# Patient Record
Sex: MALE | Race: WHITE | URBAN - METROPOLITAN AREA
[De-identification: names, ages, dates, MRNs, and addresses within clinical notes are randomized per-mention and may not be internally consistent; named-entity substitution may affect disease eponyms.]

---

## 2018-10-12 ENCOUNTER — OFFICE VISIT (OUTPATIENT)
Dept: FAMILY MEDICINE | Facility: CLINIC | Age: 38
End: 2018-10-12
Payer: COMMERCIAL

## 2018-10-12 VITALS
OXYGEN SATURATION: 98 % | BODY MASS INDEX: 28.22 KG/M2 | DIASTOLIC BLOOD PRESSURE: 68 MMHG | WEIGHT: 201.6 LBS | SYSTOLIC BLOOD PRESSURE: 132 MMHG | TEMPERATURE: 98.9 F | HEART RATE: 72 BPM | HEIGHT: 71 IN

## 2018-10-12 DIAGNOSIS — R20.2 PARESTHESIA: Primary | ICD-10-CM

## 2018-10-12 DIAGNOSIS — Z13.6 CARDIOVASCULAR SCREENING; LDL GOAL LESS THAN 160: ICD-10-CM

## 2018-10-12 LAB
ALBUMIN SERPL-MCNC: 4.1 G/DL (ref 3.4–5)
ALP SERPL-CCNC: 70 U/L (ref 40–150)
ALT SERPL W P-5'-P-CCNC: 23 U/L (ref 0–70)
ANION GAP SERPL CALCULATED.3IONS-SCNC: 4 MMOL/L (ref 3–14)
AST SERPL W P-5'-P-CCNC: 16 U/L (ref 0–45)
BILIRUB SERPL-MCNC: 0.6 MG/DL (ref 0.2–1.3)
BUN SERPL-MCNC: 13 MG/DL (ref 7–30)
CALCIUM SERPL-MCNC: 9 MG/DL (ref 8.5–10.1)
CHLORIDE SERPL-SCNC: 105 MMOL/L (ref 94–109)
CHOLEST SERPL-MCNC: 185 MG/DL
CO2 SERPL-SCNC: 28 MMOL/L (ref 20–32)
CREAT SERPL-MCNC: 1.18 MG/DL (ref 0.66–1.25)
CRP SERPL-MCNC: <2.9 MG/L (ref 0–8)
ERYTHROCYTE [DISTWIDTH] IN BLOOD BY AUTOMATED COUNT: 12.9 % (ref 10–15)
ERYTHROCYTE [SEDIMENTATION RATE] IN BLOOD BY WESTERGREN METHOD: 5 MM/H (ref 0–15)
FERRITIN SERPL-MCNC: 346 NG/ML (ref 26–388)
FOLATE SERPL-MCNC: 11.1 NG/ML
GFR SERPL CREATININE-BSD FRML MDRD: 69 ML/MIN/1.7M2
GLUCOSE SERPL-MCNC: 103 MG/DL (ref 70–99)
HCT VFR BLD AUTO: 48 % (ref 40–53)
HDLC SERPL-MCNC: 44 MG/DL
HGB BLD-MCNC: 16.6 G/DL (ref 13.3–17.7)
IRON SATN MFR SERPL: 35 % (ref 15–46)
IRON SERPL-MCNC: 113 UG/DL (ref 35–180)
LDLC SERPL CALC-MCNC: 126 MG/DL
MCH RBC QN AUTO: 30.4 PG (ref 26.5–33)
MCHC RBC AUTO-ENTMCNC: 34.6 G/DL (ref 31.5–36.5)
MCV RBC AUTO: 88 FL (ref 78–100)
NONHDLC SERPL-MCNC: 141 MG/DL
PLATELET # BLD AUTO: 222 10E9/L (ref 150–450)
POTASSIUM SERPL-SCNC: 3.8 MMOL/L (ref 3.4–5.3)
PROT SERPL-MCNC: 7.9 G/DL (ref 6.8–8.8)
RBC # BLD AUTO: 5.46 10E12/L (ref 4.4–5.9)
SODIUM SERPL-SCNC: 137 MMOL/L (ref 133–144)
TIBC SERPL-MCNC: 319 UG/DL (ref 240–430)
TRIGL SERPL-MCNC: 74 MG/DL
VIT B12 SERPL-MCNC: 450 PG/ML (ref 193–986)
WBC # BLD AUTO: 5.9 10E9/L (ref 4–11)

## 2018-10-12 PROCEDURE — 82607 VITAMIN B-12: CPT | Performed by: PHYSICIAN ASSISTANT

## 2018-10-12 PROCEDURE — 83550 IRON BINDING TEST: CPT | Performed by: PHYSICIAN ASSISTANT

## 2018-10-12 PROCEDURE — 36415 COLL VENOUS BLD VENIPUNCTURE: CPT | Performed by: PHYSICIAN ASSISTANT

## 2018-10-12 PROCEDURE — 80053 COMPREHEN METABOLIC PANEL: CPT | Performed by: PHYSICIAN ASSISTANT

## 2018-10-12 PROCEDURE — 83540 ASSAY OF IRON: CPT | Performed by: PHYSICIAN ASSISTANT

## 2018-10-12 PROCEDURE — 80061 LIPID PANEL: CPT | Performed by: PHYSICIAN ASSISTANT

## 2018-10-12 PROCEDURE — 82728 ASSAY OF FERRITIN: CPT | Performed by: PHYSICIAN ASSISTANT

## 2018-10-12 PROCEDURE — 86140 C-REACTIVE PROTEIN: CPT | Performed by: PHYSICIAN ASSISTANT

## 2018-10-12 PROCEDURE — 85652 RBC SED RATE AUTOMATED: CPT | Performed by: PHYSICIAN ASSISTANT

## 2018-10-12 PROCEDURE — 99204 OFFICE O/P NEW MOD 45 MIN: CPT | Performed by: PHYSICIAN ASSISTANT

## 2018-10-12 PROCEDURE — 85027 COMPLETE CBC AUTOMATED: CPT | Performed by: PHYSICIAN ASSISTANT

## 2018-10-12 PROCEDURE — 82746 ASSAY OF FOLIC ACID SERUM: CPT | Performed by: PHYSICIAN ASSISTANT

## 2018-10-12 NOTE — MR AVS SNAPSHOT
"              After Visit Summary   10/12/2018    Lorenzo Downs    MRN: [id number]           Patient Information     Date Of Birth          1980        Visit Information        Provider Department      10/12/2018 7:40 AM Cristy Lutz PA-C [facility]        Today's Diagnoses     Paresthesia    -  1    CARDIOVASCULAR SCREENING; LDL GOAL LESS THAN 160           Follow-ups after your visit        Who to contact     Normal or non-critical lab and imaging results will be communicated to you by [employer], letter or phone within 4 business days after the clinic has received the results. If you do not hear from us within 7 days, please contact the clinic through [employer] or phone. If you have a critical or abnormal lab result, we will notify you by phone as soon as possible.  Submit refill requests through [employer]This or call your pharmacy and they will forward the refill request to us. Please allow 3 business days for your refill to be completed.          If you need to speak with a  for additional information , please call: [phone]           Additional Information About Your Visit        [employer] It Information     DashThis lets you send messages to your doctor, view your test results, renew your prescriptions, schedule appointments and more. To sign up, go to [email or web address] . Click on \"Log in\" on the left side of the screen, which will take you to the Welcome page. Then click on \"Sign up Now\" on the right side of the page.     You will be asked to enter the access code listed below, as well as some personal information. Please follow the directions to create your username and password.     Your access code is: [email or web address]  Expires: 1/10/2019  7:42 AM     Your access code will  in 90 days. If you need help or a new code, please call your [facility] or [phone].        Care EveryWhere ID     This is your Care EveryWhere ID. This could be used by other " "organizations to access your [facility] medical records  [id number]        Your Vitals Were     Pulse Temperature Height Pulse Oximetry BMI (Body Mass Index)       72 98.9  F (37.2  C) (Tympanic) 5' 10.5\" (1.791 m) 98% 28.52 kg/m2        Blood Pressure from Last 3 Encounters:   10/12/18 132/68    Weight from Last 3 Encounters:   10/12/18 201 lb 9.6 oz (91.4 kg)              We Performed the Following     CBC with platelets     Comprehensive metabolic panel     Ferritin     Iron and iron binding capacity     Lipid panel reflex to direct LDL Fasting     Vitamin B12        Primary Care Provider Fax #    Physician No Ref-Primary [phone]       No address on file        Equal Access to Services     TINA CHISHOLM : Jim Ryder, tashi dewey, kim kaalmada stewart, anju hollis . So [facility] [phone].    ATENCIÓN: Si habla español, tiene a sherwood disposición servicios gratuitos de asistencia lingüística. Llame al [phone].    We comply with applicable federal civil rights laws and [place] laws. We do not discriminate on the basis of race, color, national origin, age, disability, sex, sexual orientation, or gender identity.            Thank you!     Thank you for choosing [facility]  for your care. Our goal is always to provide you with excellent care. Hearing back from our patients is one way we can continue to improve our services. Please take a few minutes to complete the written survey that you may receive in the mail after your visit with us. Thank you!             Your Updated Medication List - Protect others around you: Learn how to safely use, store and throw away your medicines at www.disposemymeds.org.      Notice  As of 10/12/2018  8:16 AM    You have not been prescribed any medications.      "

## 2018-10-12 NOTE — NURSING NOTE
"Initial /68  Pulse 72  Temp 98.9  F (37.2  C) (Tympanic)  Ht 5' 10.5\" (1.791 m)  Wt 201 lb 9.6 oz (91.4 kg)  SpO2 98%  BMI 28.52 kg/m2 Estimated body mass index is 28.52 kg/(m^2) as calculated from the following:    Height as of this encounter: 5' 10.5\" (1.791 m).    Weight as of this encounter: 201 lb 9.6 oz (91.4 kg). .    Itzel Davis CMA(AMAA)    "

## 2018-10-12 NOTE — PROGRESS NOTES
SUBJECTIVE:   Lorenzo Downs is a 38 year old male who presents to clinic today for the following health issues:      Musculoskeletal problem/pain      Duration: 1 year ongoing, won't go away.    Description  Location: left forearm, both legs to feet.     Intensity:  moderate    Accompanying signs and symptoms: numbness and tingling    History  Previous similar problem: no   Previous evaluation:  none    Precipitating or alleviating factors:  Trauma or overuse: no   Aggravating factors include:prolonged sitting and standing    Therapies tried and outcome: acetaminophen and Ibuprofen rarely     Symptoms come on daily.  He does do a lot of work on the computer.  Numbness is in the left forearm and upper arm mainly, sometimes on the right (does not affect his hands).  Occurs anytime there is compression to the area.  Symptoms will last a couple hours.     Sometimes it will keep him up at night and he finds himself struggling to fall asleep.  Symptoms occur while at rest.  Activity and moving around help.      He has noticed he is a bit more thirsty recently.                Problem list and histories reviewed & adjusted, as indicated.  Additional history: as documented    There is no problem list on file for this patient.    History reviewed. No pertinent surgical history.    Social History   Substance Use Topics     Smoking status: Never Smoker     Smokeless tobacco: Never Used     Alcohol use Yes      Comment: occasio nal     History reviewed. No pertinent family history.      No current outpatient prescriptions on file.     BP Readings from Last 3 Encounters:   10/12/18 132/68    Wt Readings from Last 3 Encounters:   10/12/18 201 lb 9.6 oz (91.4 kg)                    Reviewed and updated as needed this visit by clinical staff       Reviewed and updated as needed this visit by Provider         ROS:  Constitutional, HEENT, cardiovascular, pulmonary, GI, , musculoskeletal, neuro, skin, endocrine and psych systems  "are negative, except as otherwise noted.    OBJECTIVE:     /68  Pulse 72  Temp 98.9  F (37.2  C) (Tympanic)  Ht 5' 10.5\" (1.791 m)  Wt 201 lb 9.6 oz (91.4 kg)  SpO2 98%  BMI 28.52 kg/m2  Body mass index is 28.52 kg/(m^2).  GENERAL: healthy, alert and no distress  EYES: Eyes grossly normal to inspection, PERRL and conjunctivae and sclerae normal  HENT: ear canals and TM's normal, nose and mouth without ulcers or lesions  NECK: no adenopathy, no asymmetry, masses, or scars and thyroid normal to palpation  RESP: lungs clear to auscultation - no rales, rhonchi or wheezes  CV: regular rate and rhythm, normal S1 S2, no S3 or S4, no murmur, click or rub, no peripheral edema and peripheral pulses strong  ABDOMEN: soft, nontender, no hepatosplenomegaly, no masses and bowel sounds normal  MS: no gross musculoskeletal defects noted, no edema  SKIN: no suspicious lesions or rashes    Neurological Examination:  Mental Status:  Alert and oriented to time, place, and person.  Recent and remote memory intact.  Attention span and concentration normal.  Adequate fund of knowledge.  Speech:  Normal  Cranial Nerves:  Cranial Nerve #2: Visual acuity normal to finger counting.  Pupils equal and reacting to light and to accomodation.  No field defect by confrontation.  Fundus reveals normal disc margins.   Cranial #3, 4, 6:  Eye movements normal in all directions of gaze  Cranial #5: Normal pinprick sensation over the trigeminal, ophthalmic and mandibular division bilaterally.  Motor function normal.  Cranial #7: Face symmetrical in appearance and movement.  Cranial #8: Normal hearing to whispered sounds.  Cranial #9 & 10: Normal palate and uvula movement  Cranial #11:  Shoulder shrug symmetrical  Cranial #12:  Tongue midline with normal movements.  Motor:  Tone:  Normal in both upper and lower limbs.  Bulk:  Normal in both upper and lower limbs  Power: No drift of the outstretched hands.  Normal strength in all muscle groups " (5/5) of both upper and lower limbs.  Coordination:  Finger-nose test and heel-shin test normal bilaterally  Reflexes: Deep tendon reflexes 2+ and symmetrical both sides in upper and lower extremities.  Sensation:  Pin Prick: Normal in upper and lower extremities.  Gait:  Walk with a normal stride length and normal arm swing.  Normal tandem walking.  Can stand/walk on heels and toes.  Romberg sign: Negative.  Capillary refill normal in fingers and toes.   Radial and doralis pedis pulses normal.           Diagnostic Test Results:  Results for orders placed or performed in visit on 10/12/18 (from the past 24 hour(s))   CBC with platelets   Result Value Ref Range    WBC 5.9 4.0 - 11.0 10e9/L    RBC Count 5.46 4.4 - 5.9 10e12/L    Hemoglobin 16.6 13.3 - 17.7 g/dL    Hematocrit 48.0 40.0 - 53.0 %    MCV 88 78 - 100 fl    MCH 30.4 26.5 - 33.0 pg    MCHC 34.6 31.5 - 36.5 g/dL    RDW 12.9 10.0 - 15.0 %    Platelet Count 222 150 - 450 10e9/L   Erythrocyte sedimentation rate auto   Result Value Ref Range    Sed Rate 5 0 - 15 mm/h       ASSESSMENT/PLAN:       1. Paresthesia  Given that these symptoms are affect multiple extremities, I am thinking there is more of a systemic process involved here.  It does not seem to be a vascular issue, pulses and cap refill in feet and wrists normal.  Will check labs and go from there.  May consider imaging and/or referral to neuro if labs are all normal.    - Comprehensive metabolic panel  - CBC with platelets  - Iron and iron binding capacity  - Vitamin B12  - Ferritin  - CRP inflammation  - Erythrocyte sedimentation rate auto  - Folate    2. CARDIOVASCULAR SCREENING; LDL GOAL LESS THAN 160    - Lipid panel reflex to direct LDL Fasting    FUTURE APPOINTMENTS:       - Follow-up visit pending above labs.    Cristy Lutz PA-C  [facility] [address]

## 2020-03-11 ENCOUNTER — HEALTH MAINTENANCE LETTER (OUTPATIENT)
Age: 40
End: 2020-03-11

## 2021-01-03 ENCOUNTER — HEALTH MAINTENANCE LETTER (OUTPATIENT)
Age: 41
End: 2021-01-03

## 2021-04-25 ENCOUNTER — HEALTH MAINTENANCE LETTER (OUTPATIENT)
Age: 41
End: 2021-04-25

## 2021-10-10 ENCOUNTER — HEALTH MAINTENANCE LETTER (OUTPATIENT)
Age: 41
End: 2021-10-10

## 2022-05-21 ENCOUNTER — HEALTH MAINTENANCE LETTER (OUTPATIENT)
Age: 42
End: 2022-05-21

## 2022-09-18 ENCOUNTER — HEALTH MAINTENANCE LETTER (OUTPATIENT)
Age: 42
End: 2022-09-18

## 2023-06-04 ENCOUNTER — HEALTH MAINTENANCE LETTER (OUTPATIENT)
Age: 43
End: 2023-06-04